# Patient Record
Sex: FEMALE | Race: ASIAN | NOT HISPANIC OR LATINO | Employment: STUDENT | URBAN - METROPOLITAN AREA
[De-identification: names, ages, dates, MRNs, and addresses within clinical notes are randomized per-mention and may not be internally consistent; named-entity substitution may affect disease eponyms.]

---

## 2023-08-28 ENCOUNTER — TELEPHONE (OUTPATIENT)
Dept: PLASTIC SURGERY | Facility: CLINIC | Age: 18
End: 2023-08-28
Payer: COMMERCIAL

## 2023-08-28 DIAGNOSIS — F64.0 GENDER DYSPHORIA IN ADULT: Primary | ICD-10-CM

## 2023-08-28 NOTE — CONFIDENTIAL NOTE
[facility] :  Care Coordination Note     SITUATION   Thalia Ojeda (she/her) is a 17 year old female who is receiving support for:  Care Team  .    BACKGROUND     Pt is scheduled for a top surgery consult with Dr. Lamb on 5/28/24.     Pt [place] of [place] and was hoping for appointment on Spring break at end of March. Writer added her to Dr. Lamb's cancellation list.     ASSESSMENT     Surgery              [facility] Assessment  [facility] ([facility] Enrollment: (P) Enrolled  Patient has a therapist: (P) No  Letter of support #1: (P) Requested  Surgery being considered: (P) Yes  Mastectomy: (P) Yes    Pt reports:   No nicotine or other gender affirming surgeries    PLAN          Nursing Interventions:       Follow-up plan:  1. Establish with  provider and obtain AMBROSIO Harrison

## 2023-11-13 ENCOUNTER — TELEPHONE (OUTPATIENT)
Dept: PLASTIC SURGERY | Facility: CLINIC | Age: 18
End: 2023-11-13
Payer: COMMERCIAL

## 2023-11-13 NOTE — CONFIDENTIAL NOTE
Writer MARTIN re: open appts with Dr. Lamb when pt will be here for spring break (end of march). Writer instructed pt to call back in next few days if she wishes to reschedule.

## 2024-01-03 ENCOUNTER — TELEPHONE (OUTPATIENT)
Dept: PLASTIC SURGERY | Facility: CLINIC | Age: 19
End: 2024-01-03
Payer: COMMERCIAL

## 2024-01-03 NOTE — CONFIDENTIAL NOTE
Writer MARTIN re: cancellation with Dr. Lamb open on 3/12 or 3/26 at 9 am. Pt previously stated she'll be home end of March on spring break.

## 2024-01-05 ENCOUNTER — TELEPHONE (OUTPATIENT)
Dept: PLASTIC SURGERY | Facility: CLINIC | Age: 19
End: 2024-01-05
Payer: COMMERCIAL

## 2024-01-05 NOTE — CONFIDENTIAL NOTE
Writer MARTIN following up from pt VM requesting 3/26 cancellation. Writer rescheduled and informed pt via voicemail.

## 2024-01-08 NOTE — TELEPHONE ENCOUNTER
FUTURE VISIT INFORMATION      FUTURE VISIT INFORMATION:  Date: 3/26/24  Time: 9:00am  Location: [facility]  REFERRAL INFORMATION:  Reason for visit/diagnosis  top consult    RECORDS REQUESTED FROM:       No recs to collect

## 2024-03-19 ENCOUNTER — TELEPHONE (OUTPATIENT)
Dept: PLASTIC SURGERY | Facility: CLINIC | Age: 19
End: 2024-03-19
Payer: COMMERCIAL

## 2024-03-19 NOTE — TELEPHONE ENCOUNTER
RN called pt to remind about appointment 3/26. Pt would like to cancel as this date no longer works for her. Pt will call back when ready to schedule something.

## 2024-03-26 ENCOUNTER — PRE VISIT (OUTPATIENT)
Dept: PLASTIC SURGERY | Facility: CLINIC | Age: 19
End: 2024-03-26

## 2024-07-19 ENCOUNTER — TELEPHONE (OUTPATIENT)
Dept: PLASTIC SURGERY | Facility: CLINIC | Age: 19
End: 2024-07-19
Payer: COMMERCIAL

## 2024-07-19 NOTE — CONFIDENTIAL NOTE
Writer called pt back from voicemail requesting to reschedule cancelled top surgery consult with Dr. Lamb. Pt had cancelled an appt in March 2024. Writer relayed that next available is in April 2025. Suggested pt try Dr. Manley if she's trying to get a consult before going back to school out of state in the fall.